# Patient Record
Sex: MALE | Race: WHITE | Employment: OTHER | URBAN - METROPOLITAN AREA
[De-identification: names, ages, dates, MRNs, and addresses within clinical notes are randomized per-mention and may not be internally consistent; named-entity substitution may affect disease eponyms.]

---

## 2021-01-09 ENCOUNTER — APPOINTMENT (OUTPATIENT)
Dept: GENERAL RADIOLOGY | Age: 86
End: 2021-01-09
Payer: COMMERCIAL

## 2021-01-09 ENCOUNTER — HOSPITAL ENCOUNTER (EMERGENCY)
Age: 86
Discharge: HOME | End: 2021-01-09
Attending: EMERGENCY MEDICINE
Payer: COMMERCIAL

## 2021-01-09 VITALS
SYSTOLIC BLOOD PRESSURE: 168 MMHG | HEART RATE: 60 BPM | RESPIRATION RATE: 16 BRPM | DIASTOLIC BLOOD PRESSURE: 98 MMHG | WEIGHT: 200 LBS | HEIGHT: 72 IN | BODY MASS INDEX: 27.09 KG/M2 | OXYGEN SATURATION: 98 % | TEMPERATURE: 98.4 F

## 2021-01-09 DIAGNOSIS — M70.22 OLECRANON BURSITIS OF LEFT ELBOW: Primary | ICD-10-CM

## 2021-01-09 PROCEDURE — 73080 X-RAY EXAM OF ELBOW: CPT

## 2021-01-09 PROCEDURE — 6370000000 HC RX 637 (ALT 250 FOR IP): Performed by: EMERGENCY MEDICINE

## 2021-01-09 PROCEDURE — 99283 EMERGENCY DEPT VISIT LOW MDM: CPT

## 2021-01-09 RX ORDER — CEPHALEXIN 500 MG/1
500 CAPSULE ORAL 3 TIMES DAILY
Qty: 30 CAPSULE | Refills: 0 | Status: SHIPPED | OUTPATIENT
Start: 2021-01-09 | End: 2021-01-19

## 2021-01-09 RX ORDER — ACETAMINOPHEN 500 MG
500 TABLET ORAL EVERY 6 HOURS PRN
COMMUNITY

## 2021-01-09 RX ORDER — BUPROPION HYDROCHLORIDE 150 MG/1
150 TABLET ORAL DAILY
COMMUNITY
End: 2022-07-11 | Stop reason: ALTCHOICE

## 2021-01-09 RX ORDER — ASPIRIN 81 MG/1
81 TABLET ORAL DAILY
COMMUNITY

## 2021-01-09 RX ORDER — LOSARTAN POTASSIUM 50 MG/1
50 TABLET ORAL DAILY
COMMUNITY
Start: 2020-12-01 | End: 2022-07-11 | Stop reason: ALTCHOICE

## 2021-01-09 RX ORDER — OXYCODONE HCL 10 MG/1
10 TABLET, FILM COATED, EXTENDED RELEASE ORAL EVERY 12 HOURS SCHEDULED
COMMUNITY
Start: 2020-11-30 | End: 2022-07-11 | Stop reason: ALTCHOICE

## 2021-01-09 RX ORDER — CARVEDILOL 6.25 MG/1
TABLET ORAL
COMMUNITY
Start: 2020-03-30 | End: 2022-07-11 | Stop reason: ALTCHOICE

## 2021-01-09 RX ORDER — TAMSULOSIN HYDROCHLORIDE 0.4 MG/1
CAPSULE ORAL
COMMUNITY
Start: 2020-12-23 | End: 2022-07-11 | Stop reason: ALTCHOICE

## 2021-01-09 RX ORDER — CEPHALEXIN 250 MG/1
500 CAPSULE ORAL ONCE
Status: COMPLETED | OUTPATIENT
Start: 2021-01-09 | End: 2021-01-09

## 2021-01-09 RX ORDER — LIDOCAINE 50 MG/G
1 PATCH TOPICAL EVERY 24 HOURS
COMMUNITY
End: 2022-07-11 | Stop reason: ALTCHOICE

## 2021-01-09 RX ADMIN — CEPHALEXIN 500 MG: 250 CAPSULE ORAL at 15:04

## 2021-01-09 ASSESSMENT — PAIN SCALES - GENERAL: PAINLEVEL_OUTOF10: 0

## 2021-01-09 ASSESSMENT — PAIN DESCRIPTION - ORIENTATION: ORIENTATION: LEFT

## 2021-01-09 NOTE — ED NOTES
Pt ambulated to room 6. C/o left elbow pain. Pt reports he fell approx 2 wks ago onto a tile floor and struck his left elbow. Pt reports he has had pain to left elbow but it was not bothersome. Pt reports today he felt the elbow was more warm than normal. Pt reports he does not have any pain with ROM of left elbow. sts he only has pain with leaning on the elbow. Pt alert and oriented speaking sentences no distress noted. PT  Noted to have redness and swelling to left elbow.  Pt noted to have scabbing to left elbow     Yessi García RN  01/09/21 7158

## 2021-01-09 NOTE — ED PROVIDER NOTES
[address] [facility] ED  [address] [facility] [address]  Phone: [phone]  Fax: [phone]      eMERGENCY dEPARTMENT eNCOUnter      Pt Name: Negar Blanchard  MRN: [id number]  [place] 1934  Date of evaluation: 1/9/21      CHIEF COMPLAINT:  Chief Complaint   Patient presents with    Joint Swelling     left elbow pain and swelling since fall 2 weeks ago       [address] [place] [address]    Negar Blanchard is a 80 y.o. male who presents with evaluation for orthopedic pain:    Location/Symptom:   L elbow pain  Timing/Onset:  2 weeks  Context/Setting:   Pt here for evaluation of left elbow swelling/pain/warmth. He fell on this elbow about 2 weeks ago, painful since that time. Most painful when leaning on this elbow, no real pain with any movement or use. He reports more warmth/redness over the last day or so. No discharge/bleeding/f/c/n/v. No paresthesias/focal weakness. Tetanus UTD. Nursing Notes were reviewed. REVIEW OF SYSTEMS       Constitutional: Denies recent fever, chills. Eyes: No vision changes. Neck: No neck pain. Respiratory: Denies recent shortness of breath. Cardiac:  Denies recent chest pain. GI:  Denies abdominal pain/nausea/vomiting/diarrhea. : Denies dysuria. Musculoskeletal:   Per HPI  Neurologic:  No headache. No focal weakness. No paresthesias. Skin:  Denies any rash. Negative in 10 essential Systems except as mentioned above and in the HPI. PAST MEDICAL HISTORY   PMH:  has a past medical history of Arthritis, Asthma, CAD (coronary artery disease), Cataract, COPD (chronic obstructive pulmonary disease) ([facility] [address] Degenerative disc disease, lumbar, Depression, GERD (gastroesophageal reflux disease), Hearing loss, Hyperlipidemia, Hypertension, and Peptic ulcer disease. Surgical History:  has a past surgical history that includes Pacemaker insertion; Tonsillectomy; Colonoscopy; and Cardiac catheterization.   Social History:  reports that he has never smoked. He has never used smokeless tobacco. He reports that he does not drink alcohol or use drugs. Family History: None  Psychiatric History: None    Allergies:is allergic to atorvastatin and ibuprofen. PHYSICAL EXAM     INITIAL VITALS: BP (!) 168/98   Pulse 60   Temp 98.4 °F (36.9 °C) (Oral)   Resp 16   Ht 6' (1.829 m)   Wt 90.7 kg (200 lb)   SpO2 98%   BMI 27.12 kg/m²     Constitutional:  Well developed   Eyes:  Pupils equal/round  HENT:  Atraumatic, external ears normal, nose normal  Respiratory:  Comfortable speech and breathing  Musculoskeletal:  Warmth/erythema around left olecranon, some mild fluctuance only. Full/active ROM of elbow w/o pain. TTP of olecranon. No epicondylar TTP. NV intact distally. Integument:   No rash. Neurologic:  Alert & appropriate mentation/interaction, no focal deficits noted     DIAGNOSTIC RESULTS     EKG: All EKG's are interpreted by the Emergency Department Physician who either signs or Co-signs this chart in the absence of a cardiologist.  Not indicated    RADIOLOGY:   Reviewed the radiologist:  XR ELBOW LEFT (MIN 3 VIEWS)   Final Result   No acute bony abnormality. Mild posterior soft tissue swelling may be posttraumatic given the clinical   history. Mild olecranon bursitis could have a similar appearance in the   appropriate clinical setting. LABS:  Labs Reviewed - No data to display  Not indicated    EMERGENCY DEPARTMENT COURSE / MDM:     [phone]  Looks like traumatic bursitis sequela but will likely provide some Keflex as becoming more red/warm over the last last day or so after initial injury 2 weeks ago. Otherwise no symptoms or complaints. [id number]  Attending discharged this patient after discussing all labwork/imaging results that were finalized. Treatment plan and recommended follow-up discussed with them as well.       Orders Placed This Encounter   Medications    cephALEXin (KEFLEX) 500 MG capsule     Sig: Take 1 capsule by mouth 3 times daily for 10 days     Dispense:  30 capsule     Refill:  0    cephALEXin (KEFLEX) capsule 500 mg     Order Specific Question:   Antimicrobial Indications     Answer:   Skin and Soft Tissue Infection       CONSULTS:  None      FINAL IMPRESSION      1.  Olecranon bursitis of left elbow          DISPOSITION/PLAN:  DISPOSITION Decision To Discharge 01/09/2021 03:46:59 PM        PATIENT REFERRED TO:  Marc Castro  [address] [phone]    Schedule an appointment as soon as possible for a visit in 1 week        DISCHARGE MEDICATIONS:  Discharge Medication List as of 1/9/2021  2:48 PM      START taking these medications    Details   cephALEXin (KEFLEX) 500 MG capsule Take 1 capsule by mouth 3 times daily for 10 days, Disp-30 capsule, R-0Print             (Please note that portions of this note were completed with a voice recognition program.  Efforts were made to edit the dictations but occasionally words are mis-transcribed.)    ELIESER Glass PA-C  01/09/21 4292

## 2021-01-09 NOTE — ED PROVIDER NOTES
Emergency Department         COMPLAINT       Chief Complaint   Patient presents with    Joint Swelling     left elbow pain and swelling since fall 2 weeks ago      PHYSICAL EXAM      ED Triage Vitals [01/09/21 1330]   BP Temp Temp Source Pulse Resp SpO2 Height Weight   (!) 168/98 98.4 °F (36.9 °C) Oral 60 16 98 % 6' (1.829 m) 200 lb (90.7 kg)       Constitutional: Alert, oriented x3, nontoxic, answering questions appropriately, acting properly for age, in no acute distress   HEENT: Extraocular muscles intact,   Neck: Trachea midline,  Musculoskeletal: Left upper extremity no pain at the shoulder or wrist.  Radial and ulnar arteries intact and capillary refill less than 2 seconds. There is an abrasion to the olecranon with some surrounding erythema and some swelling. Full range of motion. No bony point tenderness. Neurologic: Left upper extremity motor and sensory intact. Skin: Warm and dry       Physical Exam  DIAGNOSTIC RESULTS     EKG: All EKG's are interpreted by the Emergency Department Physician who either signs or Co-signs this chart in the absence of a cardiologist.    Not indicated unless otherwise documented above or in the midlevel documentation    LABS:  No results found for this visit on 01/09/21. Not indicated unless otherwise documented above or in the midlevel documentation    RADIOLOGY:   I reviewedthe radiologist interpretations:  XR ELBOW LEFT (MIN 3 VIEWS)   Preliminary Result   No acute bony abnormality. Mild posterior soft tissue swelling may be posttraumatic given the clinical   history. Mild olecranon bursitis could have a similar appearance in the   appropriate clinical setting. Not indicated unless otherwise documented above or in the midlevel documentation    EMERGENCY DEPARTMENT COURSE:       PERTINENT ATTENDING PHYSICIAN COMMENTS:    Fall 2 weeks ago left elbow pain with redness. Will treat with antibiotics. X-ray.     2:20 PM x-ray unremarkable for

## 2022-07-11 ENCOUNTER — HOSPITAL ENCOUNTER (EMERGENCY)
Age: 87
Discharge: HOME | End: 2022-07-11
Attending: EMERGENCY MEDICINE
Payer: COMMERCIAL

## 2022-07-11 VITALS
BODY MASS INDEX: 27.12 KG/M2 | RESPIRATION RATE: 20 BRPM | OXYGEN SATURATION: 95 % | DIASTOLIC BLOOD PRESSURE: 71 MMHG | HEART RATE: 62 BPM | WEIGHT: 200 LBS | SYSTOLIC BLOOD PRESSURE: 161 MMHG | TEMPERATURE: 98.2 F

## 2022-07-11 DIAGNOSIS — R31.9 URINARY TRACT INFECTION WITH HEMATURIA, SITE UNSPECIFIED: Primary | ICD-10-CM

## 2022-07-11 DIAGNOSIS — N39.0 URINARY TRACT INFECTION WITH HEMATURIA, SITE UNSPECIFIED: Primary | ICD-10-CM

## 2022-07-11 LAB
-: ABNORMAL
ABSOLUTE EOS #: 0 K/UL (ref 0–0.4)
ABSOLUTE LYMPH #: 0.6 K/UL (ref 1–4.8)
ABSOLUTE MONO #: 0.7 K/UL (ref 0.1–1.2)
ALBUMIN SERPL-MCNC: 3.8 G/DL (ref 3.5–5.2)
ALBUMIN/GLOBULIN RATIO: 1.2 (ref 1–2.5)
ALP BLD-CCNC: 76 U/L (ref 40–129)
ALT SERPL-CCNC: 16 U/L (ref 5–41)
ANION GAP SERPL CALCULATED.3IONS-SCNC: 11 MMOL/L (ref 9–17)
AST SERPL-CCNC: 32 U/L
BACTERIA: ABNORMAL
BASOPHILS # BLD: 0 % (ref 0–2)
BASOPHILS ABSOLUTE: 0 K/UL (ref 0–0.2)
BILIRUB SERPL-MCNC: 0.4 MG/DL (ref 0.3–1.2)
BILIRUBIN URINE: NEGATIVE
BUN BLDV-MCNC: 40 MG/DL (ref 8–23)
CALCIUM SERPL-MCNC: 9.1 MG/DL (ref 8.6–10.4)
CHLORIDE BLD-SCNC: 104 MMOL/L (ref 98–107)
CO2: 25 MMOL/L (ref 20–31)
COLOR: YELLOW
CREAT SERPL-MCNC: 0.97 MG/DL (ref 0.7–1.2)
EOSINOPHILS RELATIVE PERCENT: 0 % (ref 1–4)
EPITHELIAL CELLS UA: ABNORMAL /HPF (ref 0–5)
GFR AFRICAN AMERICAN: >60 ML/MIN
GFR NON-AFRICAN AMERICAN: >60 ML/MIN
GFR SERPL CREATININE-BSD FRML MDRD: ABNORMAL ML/MIN/{1.73_M2}
GLUCOSE BLD-MCNC: 136 MG/DL (ref 70–99)
GLUCOSE URINE: NEGATIVE
HCT VFR BLD CALC: 35.3 % (ref 41–53)
HEMOGLOBIN: 12.1 G/DL (ref 13.5–17.5)
KETONES, URINE: NEGATIVE
LEUKOCYTE ESTERASE, URINE: ABNORMAL
LYMPHOCYTES # BLD: 6 % (ref 24–44)
MCH RBC QN AUTO: 31.1 PG (ref 26–34)
MCHC RBC AUTO-ENTMCNC: 34.3 G/DL (ref 31–37)
MCV RBC AUTO: 90.8 FL (ref 80–100)
MONOCYTES # BLD: 6 % (ref 2–11)
NITRITE, URINE: NEGATIVE
PDW BLD-RTO: 12.8 % (ref 12.5–15.4)
PH UA: 6 (ref 5–8)
PLATELET # BLD: 135 K/UL (ref 140–450)
PMV BLD AUTO: 9.3 FL (ref 6–12)
POTASSIUM SERPL-SCNC: 4.2 MMOL/L (ref 3.7–5.3)
PROTEIN UA: ABNORMAL
RBC # BLD: 3.89 M/UL (ref 4.5–5.9)
RBC UA: ABNORMAL /HPF (ref 0–2)
SEG NEUTROPHILS: 88 % (ref 36–66)
SEGMENTED NEUTROPHILS ABSOLUTE COUNT: 9.1 K/UL (ref 1.8–7.7)
SODIUM BLD-SCNC: 140 MMOL/L (ref 135–144)
SPECIFIC GRAVITY UA: 1.02 (ref 1–1.03)
TOTAL PROTEIN: 7 G/DL (ref 6.4–8.3)
TURBIDITY: ABNORMAL
URINE HGB: ABNORMAL
UROBILINOGEN, URINE: NORMAL
WBC # BLD: 10.5 K/UL (ref 3.5–11)
WBC UA: ABNORMAL /HPF (ref 0–5)

## 2022-07-11 PROCEDURE — 36415 COLL VENOUS BLD VENIPUNCTURE: CPT

## 2022-07-11 PROCEDURE — 6370000000 HC RX 637 (ALT 250 FOR IP): Performed by: EMERGENCY MEDICINE

## 2022-07-11 PROCEDURE — 99283 EMERGENCY DEPT VISIT LOW MDM: CPT

## 2022-07-11 PROCEDURE — 80053 COMPREHEN METABOLIC PANEL: CPT

## 2022-07-11 PROCEDURE — 81001 URINALYSIS AUTO W/SCOPE: CPT

## 2022-07-11 PROCEDURE — 85025 COMPLETE CBC W/AUTO DIFF WBC: CPT

## 2022-07-11 PROCEDURE — 86403 PARTICLE AGGLUT ANTBDY SCRN: CPT

## 2022-07-11 PROCEDURE — 87086 URINE CULTURE/COLONY COUNT: CPT

## 2022-07-11 RX ORDER — CEPHALEXIN 250 MG/1
500 CAPSULE ORAL ONCE
Status: COMPLETED | OUTPATIENT
Start: 2022-07-11 | End: 2022-07-11

## 2022-07-11 RX ORDER — QUETIAPINE FUMARATE 25 MG/1
50 TABLET, FILM COATED ORAL NIGHTLY
COMMUNITY

## 2022-07-11 RX ORDER — FINASTERIDE 5 MG/1
5 TABLET, FILM COATED ORAL DAILY
COMMUNITY

## 2022-07-11 RX ORDER — FUROSEMIDE 20 MG/1
10 TABLET ORAL DAILY
COMMUNITY

## 2022-07-11 RX ORDER — M-VIT,TX,IRON,MINS/CALC/FOLIC 27MG-0.4MG
1 TABLET ORAL DAILY
COMMUNITY

## 2022-07-11 RX ORDER — CEPHALEXIN 500 MG/1
500 CAPSULE ORAL 2 TIMES DAILY
Qty: 14 CAPSULE | Refills: 0 | Status: SHIPPED | OUTPATIENT
Start: 2022-07-11 | End: 2022-07-18

## 2022-07-11 RX ORDER — LOSARTAN POTASSIUM 100 MG/1
100 TABLET ORAL NIGHTLY
COMMUNITY

## 2022-07-11 RX ORDER — CARVEDILOL 25 MG/1
50 TABLET ORAL 2 TIMES DAILY WITH MEALS
COMMUNITY

## 2022-07-11 RX ORDER — POLYETHYLENE GLYCOL 3350 17 G/17G
17 POWDER, FOR SOLUTION ORAL DAILY PRN
COMMUNITY

## 2022-07-11 RX ORDER — BUPROPION HYDROCHLORIDE 100 MG/1
100 TABLET ORAL 2 TIMES DAILY
COMMUNITY

## 2022-07-11 RX ORDER — SPIRONOLACTONE 25 MG/1
25 TABLET ORAL DAILY
COMMUNITY

## 2022-07-11 RX ORDER — TAMSULOSIN HYDROCHLORIDE 0.4 MG/1
0.4 CAPSULE ORAL NIGHTLY
COMMUNITY

## 2022-07-11 RX ORDER — MENTHOL AND ZINC OXIDE .44; 20.625 G/100G; G/100G
OINTMENT TOPICAL DAILY
COMMUNITY

## 2022-07-11 RX ORDER — HYDRALAZINE HYDROCHLORIDE 25 MG/1
25 TABLET, FILM COATED ORAL EVERY 12 HOURS PRN
COMMUNITY

## 2022-07-11 RX ADMIN — CEPHALEXIN 500 MG: 250 CAPSULE ORAL at 06:20

## 2022-07-11 ASSESSMENT — PAIN SCALES - GENERAL: PAINLEVEL_OUTOF10: 4

## 2022-07-11 ASSESSMENT — PAIN - FUNCTIONAL ASSESSMENT: PAIN_FUNCTIONAL_ASSESSMENT: 0-10

## 2022-07-11 ASSESSMENT — PAIN DESCRIPTION - LOCATION: LOCATION: BACK

## 2022-07-11 NOTE — ED NOTES
Report called to [facility] AND [facility]. Spoke with Marcello DRISCOLL.       Duane Crawley RN  07/11/22 9982

## 2022-07-11 NOTE — ED PROVIDER NOTES
[address] [facility] ED  [address] [address] [facility] [address]  Phone: [phone]  Fax: [phone]      Pt Name: Britt Rollins  QWO:[id number]  [place] 1934  Date of evaluation: 7/11/2022      CHIEF COMPLAINT       Chief Complaint   Patient presents with    Fatigue     to er per ems from [facility] for decline in condition. HISTORY OF PRESENT ILLNESS   Britt Rollins is a 80 y.o. male with history of dementia who presents for evaluation of reported fatigue. The patient arrives from [facility]. The nurse at [facility] told EMS that the patient has been \"declining\" since he went to bed last night. The staff reports that they suspected the patient had a UTI yesterday and sent out a urine sample but do not have the results of the urinalysis yet. They noticed that he ate slightly less for dinner last night. They thought that he felt warm when they checked on him at 4 AM and got a temperature of 99F and blood pressure of 102/67. He was not given any medications. The patient arrives afebrile with blood pressure of 175/102 which is his baseline. The patient denies any complaints other than pain over his chronic sacral decubitus ulcer. He is unable to provide any additional information. When we called [facility] they were unable to provide any additional information. The patient is a full code. REVIEW OF SYSTEMS     Unable to perform review of systems secondary to the patient's dementia    PAST MEDICAL HISTORY    has a past medical history of Arthritis, Asthma, CAD (coronary artery disease), Cataract, COPD (chronic obstructive pulmonary disease) ([facility] [address] Degenerative disc disease, lumbar, Depression, GERD (gastroesophageal reflux disease), Hearing loss, Hyperlipidemia, Hypertension, and Peptic ulcer disease. SURGICAL HISTORY      has a past surgical history that includes Pacemaker insertion; Tonsillectomy; Colonoscopy; and Cardiac catheterization.     CURRENT MEDICATIONS       Previous Medications    ACETAMINOPHEN (TYLENOL) 500 MG TABLET    Take 500 mg by mouth every 6 hours as needed    ASPIRIN 81 MG EC TABLET    Take 81 mg by mouth daily    BUPROPION (WELLBUTRIN) 100 MG TABLET    Take 100 mg by mouth 2 times daily    CARVEDILOL (COREG) 25 MG TABLET    Take 50 mg by mouth 2 times daily (with meals)    CYANOCOBALAMIN 1000 MCG TABLET    Take 1,000 mcg by mouth daily    FINASTERIDE (PROSCAR) 5 MG TABLET    Take 5 mg by mouth daily    FUROSEMIDE (LASIX) 20 MG TABLET    Take 10 mg by mouth daily    HYDRALAZINE (APRESOLINE) 25 MG TABLET    Take 25 mg by mouth every 12 hours as needed    LOSARTAN (COZAAR) 100 MG TABLET    Take 100 mg by mouth at bedtime    MENTHOL-CETYLPYRIDINIUM (CEPACOL) 3 MG LOZENGE    Take 1 lozenge by mouth as needed for Sore Throat    MENTHOL-ZINC OXIDE (CALMOSEPTINE) 0.44-20.625 % OINT OINTMENT    Apply topically daily Max 30 ml per day. MULTIPLE VITAMINS-MINERALS (THERAPEUTIC MULTIVITAMIN-MINERALS) TABLET    Take 1 tablet by mouth daily    POLYETHYLENE GLYCOL (GLYCOLAX) 17 G PACKET    Take 17 g by mouth daily as needed for Constipation    PROBIOTIC PRODUCT (PROBIOTIC-10 PO)    Take by mouth    QUETIAPINE (SEROQUEL) 25 MG TABLET    Take 50 mg by mouth at bedtime    SPIRONOLACTONE (ALDACTONE) 25 MG TABLET    Take 25 mg by mouth daily    TAMSULOSIN (FLOMAX) 0.4 MG CAPSULE    Take 0.4 mg by mouth at bedtime       ALLERGIES     is allergic to atorvastatin and ibuprofen. FAMILY HISTORY     has no family status information on file. family history is not on file. SOCIAL HISTORY      reports that he has never smoked. He has never used smokeless tobacco. He reports that he does not drink alcohol and does not use drugs. PHYSICAL EXAM     INITIAL VITALS:  weight is 90.7 kg (200 lb). His oral temperature is 98.2 °F (36.8 °C). His blood pressure is 175/102 (abnormal) and his pulse is 83. His respiration is 18 and oxygen saturation is 96%.      CONSTITUTIONAL: no apparent distress, well appearing  SKIN: Sacral decubitus ulcer with medicated bandage. No surrounding erythema, edema, warmth or drainage. Skin otherwise warm, dry, no jaundice, hives or petechiae  EYES: clear conjunctiva, non-icteric sclera  HENT: normocephalic, atraumatic, moist mucus membranes  NECK: Nontender and supple with no nuchal rigidity, full range of motion  PULMONARY: clear to auscultation without wheezes, rhonchi, or rales, normal excursion, no accessory muscle use and no stridor  CARDIOVASCULAR: regular rate, rhythm. Strong radial pulses with intact distal perfusion. Capillary refill <2 seconds. GASTROINTESTINAL: soft, non-tender, non-distended, no palpable masses, no rebound or guarding   GENITOURINARY: No costovertebral angle tenderness to palpation  MUSCULOSKELETAL: No midline spinal tenderness, step off or deformity. Extremities are otherwise nontender to palpation and nonerythematous. Compartments soft. No peripheral edema. NEUROLOGIC: alert and oriented x self, GCS 15, normal speech. Moves all extremities x 4 without motor or sensory deficit  PSYCHIATRIC: Dementia    DIAGNOSTIC RESULTS     EKG:  None    RADIOLOGY:   No results found.     LABS:  Results for orders placed or performed during the hospital encounter of 07/11/22   CMP   Result Value Ref Range    Glucose 136 (H) 70 - 99 mg/dL    BUN 40 (H) 8 - 23 mg/dL    CREATININE 0.97 0.70 - 1.20 mg/dL    Calcium 9.1 8.6 - 10.4 mg/dL    Sodium 140 135 - 144 mmol/L    Potassium 4.2 3.7 - 5.3 mmol/L    Chloride 104 98 - 107 mmol/L    CO2 25 20 - 31 mmol/L    Anion Gap 11 9 - 17 mmol/L    Alkaline Phosphatase 76 40 - 129 U/L    ALT 16 5 - 41 U/L    AST 32 <40 U/L    Total Bilirubin 0.40 0.3 - 1.2 mg/dL    Total Protein 7.0 6.4 - 8.3 g/dL    Albumin 3.8 3.5 - 5.2 g/dL    Albumin/Globulin Ratio 1.2 1.0 - 2.5    GFR Non-African American >60 >60 mL/min    GFR African American >60 >60 mL/min    GFR Comment         CBC with Auto Differential   Result Value Ref Range    WBC 10.5 3.5 - 11.0 k/uL    RBC 3.89 (L) 4.5 - 5.9 m/uL    Hemoglobin 12.1 (L) 13.5 - 17.5 g/dL    Hematocrit 35.3 (L) 41 - 53 %    MCV 90.8 80 - 100 fL    MCH 31.1 26 - 34 pg    MCHC 34.3 31 - 37 g/dL    RDW 12.8 12.5 - 15.4 %    Platelets 154 (L) 590 - 450 k/uL    MPV 9.3 6.0 - 12.0 fL    Seg Neutrophils 88 (H) 36 - 66 %    Lymphocytes 6 (L) 24 - 44 %    Monocytes 6 2 - 11 %    Eosinophils % 0 (L) 1 - 4 %    Basophils 0 0 - 2 %    Segs Absolute 9.10 (H) 1.8 - 7.7 k/uL    Absolute Lymph # 0.60 (L) 1.0 - 4.8 k/uL    Absolute Mono # 0.70 0.1 - 1.2 k/uL    Absolute Eos # 0.00 0.0 - 0.4 k/uL    Basophils Absolute 0.00 0.0 - 0.2 k/uL   Urinalysis   Result Value Ref Range    Color, UA Yellow Yellow    Turbidity UA Cloudy (A) Clear    Glucose, Ur NEGATIVE NEGATIVE    Bilirubin Urine NEGATIVE NEGATIVE    Ketones, Urine NEGATIVE NEGATIVE    Specific Gravity, UA 1.025 1.005 - 1.030    Urine Hgb LARGE (A) NEGATIVE    pH, UA 6.0 5.0 - 8.0    Protein, UA TRACE (A) NEGATIVE    Urobilinogen, Urine Normal Normal    Nitrite, Urine NEGATIVE NEGATIVE    Leukocyte Esterase, Urine SMALL (A) NEGATIVE   Microscopic Urinalysis   Result Value Ref Range    -          WBC, UA TOO NUMEROUS TO COUNT 0 - 5 /HPF    RBC, UA TOO NUMEROUS TO COUNT 0 - 2 /HPF    Epithelial Cells UA 2 TO 5 0 - 5 /HPF    Bacteria, UA MANY (A) None       EMERGENCY DEPARTMENT COURSE:        The patient was given the following medications:  Orders Placed This Encounter   Medications    cephALEXin (KEFLEX) capsule 500 mg     Order Specific Question:   Antimicrobial Indications     Answer:   Urinary Tract Infection    cephALEXin (KEFLEX) 500 MG capsule     Sig: Take 1 capsule by mouth 2 times daily for 7 days     Dispense:  14 capsule     Refill:  0        Vitals:    Vitals:    07/11/22 0453   BP: (!) 175/102   Pulse: 83   Resp: 18   Temp: 98.2 °F (36.8 °C)   TempSrc: Oral   SpO2: 96%   Weight: 90.7 kg (200 lb)     -------------------------  BP: (!) 175/102, worsen      DISCHARGE MEDICATIONS:  New Prescriptions    CEPHALEXIN (KEFLEX) 500 MG CAPSULE    Take 1 capsule by mouth 2 times daily for 7 days       (Please note that portions of this note were completed with a voice recognitionprogram.  Efforts were made to edit the dictations but occasionally words are mis-transcribed.)    Shawn Snyder DO, [facility]  Emergency Physician Attending         Shawn Snyder DO  07/11/22 2524

## 2022-07-11 NOTE — ED NOTES
Helen Danielle arrived to pickup patient and take back to [place].       Anastasia Teran RN  07/11/22 9683

## 2022-07-12 LAB
CULTURE: ABNORMAL
SPECIMEN DESCRIPTION: ABNORMAL